# Patient Record
Sex: FEMALE | Race: WHITE
[De-identification: names, ages, dates, MRNs, and addresses within clinical notes are randomized per-mention and may not be internally consistent; named-entity substitution may affect disease eponyms.]

---

## 2018-03-21 ENCOUNTER — HOSPITAL ENCOUNTER (OUTPATIENT)
Dept: HOSPITAL 17 - PHSDC | Age: 63
Discharge: HOME | End: 2018-03-21
Attending: SURGERY
Payer: COMMERCIAL

## 2018-03-21 VITALS
HEART RATE: 78 BPM | OXYGEN SATURATION: 98 % | DIASTOLIC BLOOD PRESSURE: 71 MMHG | RESPIRATION RATE: 16 BRPM | SYSTOLIC BLOOD PRESSURE: 135 MMHG | TEMPERATURE: 98 F

## 2018-03-21 VITALS — WEIGHT: 170.35 LBS | BODY MASS INDEX: 25.82 KG/M2 | HEIGHT: 68 IN

## 2018-03-21 DIAGNOSIS — E05.90: ICD-10-CM

## 2018-03-21 DIAGNOSIS — M71.341: Primary | ICD-10-CM

## 2018-03-21 DIAGNOSIS — M19.90: ICD-10-CM

## 2018-03-21 PROCEDURE — 01810 ANES PX NRV MUSC F/ARM WRST: CPT

## 2018-03-21 PROCEDURE — 26160 REMOVE TENDON SHEATH LESION: CPT

## 2018-03-21 NOTE — MP
cc:

Jerrod Kenyon MD

****

 

 

DATE OF OPERATION:

03/21/2018

 

PREOPERATIVE DIAGNOSIS:

Right fourth and fifth finger mucous cyst.

 

PROCEDURE:

1.  Right fourth finger mucous cyst excision.

2.  Right fifth finger mucous cyst excision.

 

SURGEON:

Jerrod Kenyon III, MD

 

DETAILS OF PROCEDURE:

The patient was brought to the operating room and placed supine on the

operating table.  After the correct site and side of surgery were 

verified by members of each team in the room multiple times including 

the patient and myself and, after adequate preoperative markings and 

preoperative written consent was verified by everyone and, after a 

preoperative timeout was performed to everyone's satisfaction and, 

after adequate IV sedation was achieved, the right upper extremity was

prepped and draped in traditional sterile surgical fashion.  A 50:50 

mixture of 2% plain lidocaine and 0.5% plain Marcaine was injected at 

the metacarpal level to provide a digital block for the fourth and 

fifth fingers.

 

The fifth finger was addressed first and the 2 smallest fingers of a 

size 6 sterile glove was used as a finger tourniquet.  A T-shaped 

incision over the dorsoradial aspect of the DIP joint was then made 

and carried down through skin and subcutaneous tissue.  The extensor 

tendon was then isolated and protected and the skin dissected free 

from it.  The irritated and inflamed part of the extensor tendon 

radially and joint capsule was then identified and excised.  The DIP 

joint was then identified and the inflamed edge with the obvious 

osteophyte was then excised down to smooth surfaces.  Attenuated skin 

was then excised as well as the cyst cavity.  Thorough irrigation was 

then performed.  There were no other anatomic abnormalities.  The 

joint was then flushed out with another 200 mL of normal saline.  The 

skin edges were then reapproximated using running and interrupted 5-0 

nylon suture.

 

The identical procedure was performed for the ring finger, although it

was a smaller incision.

 

The hand and arm were thoroughly cleansed and dried.  Betadine and 

Adaptic dressings were applied on top of the wounds, followed by a 

bulky soft dressing.  A gentle circumferential dressing was applied.  

The patient was awakened from anesthesia and transported to the 

postanesthesia care unit awake and in stable condition at the end of 

the case.  The fingers became immediately soft, pink and warm and had 

brisk capillary refill of less than 2 seconds when the finger 

tourniquets were released.

 

 

__________________________________

Jerrod MD DWIGHT Carlson

D: 03/21/2018, 10:30 AM

T: 03/21/2018, 11:03 AM

Visit #: [id number]

Job #: [id number]